# Patient Record
Sex: FEMALE | URBAN - METROPOLITAN AREA
[De-identification: names, ages, dates, MRNs, and addresses within clinical notes are randomized per-mention and may not be internally consistent; named-entity substitution may affect disease eponyms.]

---

## 2019-12-10 ENCOUNTER — APPOINTMENT (OUTPATIENT)
Age: 44
Setting detail: DERMATOLOGY
End: 2019-12-10

## 2019-12-10 DIAGNOSIS — L50.3 DERMATOGRAPHIC URTICARIA: ICD-10-CM

## 2019-12-10 PROBLEM — K75.9 INFLAMMATORY LIVER DISEASE, UNSPECIFIED: Status: ACTIVE | Noted: 2019-12-10

## 2019-12-10 PROCEDURE — 99202 OFFICE O/P NEW SF 15 MIN: CPT

## 2019-12-10 PROCEDURE — ? PRESCRIPTION

## 2019-12-10 PROCEDURE — ? COUNSELING

## 2019-12-10 PROCEDURE — ? TREATMENT REGIMEN

## 2019-12-10 ASSESSMENT — LOCATION ZONE DERM: LOCATION ZONE: TRUNK

## 2019-12-10 ASSESSMENT — LOCATION DETAILED DESCRIPTION DERM: LOCATION DETAILED: RIGHT MEDIAL UPPER BACK

## 2019-12-10 ASSESSMENT — LOCATION SIMPLE DESCRIPTION DERM: LOCATION SIMPLE: RIGHT UPPER BACK

## 2019-12-10 NOTE — PROCEDURE: TREATMENT REGIMEN
Detail Level: Zone
Modify Regimen: increase levocetirizine 5mg daily-> 10mg bid. then titrate downward as tolerated

## 2019-12-10 NOTE — PROCEDURE: MIPS QUALITY
Additional Notes: \\n\\n*** FLU SHOT since August 1, 2019:  NO
Detail Level: Detailed
Quality 110: Preventive Care And Screening: Influenza Immunization: Influenza Immunization not Administered for Documented Reasons.

## 2019-12-10 NOTE — HPI: RASH
What Type Of Note Output Would You Prefer (Optional)?: Standard Output
Is The Patient Presenting As Previously Scheduled?: Yes
How Severe Is Your Rash?: moderate
Is This A New Presentation, Or A Follow-Up?: Rash
Additional History: Patient was given antihistamines by pcp which helps

## 2021-11-09 ENCOUNTER — PREPPED CHART (OUTPATIENT)
Dept: URBAN - METROPOLITAN AREA CLINIC 33 | Facility: CLINIC | Age: 46
End: 2021-11-09

## 2021-11-09 PROBLEM — H43.812 POSTERIOR VITREOUS DETACHMENT: Noted: 2021-11-09

## 2021-11-09 PROBLEM — H44.112 PANUVEITIS: Noted: 2021-11-09

## 2021-11-09 PROBLEM — H43.821 VITREOMACULAR ADHESION: Noted: 2021-11-09

## 2021-11-09 PROBLEM — H43.392 VITREOUS OPACITIES: Noted: 2021-11-09

## 2021-11-09 PROBLEM — H31.093 CHORIORETINAL SCARS: Noted: 2021-11-09

## 2022-04-30 ASSESSMENT — VISUAL ACUITY
OS_CC: 20/30+2
OD_PH: 20/30-2
OD_CC: 20/60-2

## 2022-04-30 ASSESSMENT — TONOMETRY
OS_IOP_MMHG: 21
OD_IOP_MMHG: 19

## 2022-05-10 ENCOUNTER — FOLLOW UP (OUTPATIENT)
Dept: URBAN - METROPOLITAN AREA CLINIC 33 | Facility: CLINIC | Age: 47
End: 2022-05-10

## 2022-05-10 DIAGNOSIS — H43.821: ICD-10-CM

## 2022-05-10 DIAGNOSIS — H44.112: ICD-10-CM

## 2022-05-10 PROCEDURE — 92134 CPTRZ OPH DX IMG PST SGM RTA: CPT

## 2022-05-10 PROCEDURE — 92014 COMPRE OPH EXAM EST PT 1/>: CPT

## 2022-05-10 PROCEDURE — 92202 OPSCPY EXTND ON/MAC DRAW: CPT

## 2022-05-10 ASSESSMENT — TONOMETRY
OS_IOP_MMHG: 14
OD_IOP_MMHG: 16

## 2022-05-10 ASSESSMENT — VISUAL ACUITY
OD_CC: 20/200
OS_CC: 20/30-2
OD_PH: 20/50+1

## 2022-11-29 ENCOUNTER — FOLLOW UP (OUTPATIENT)
Dept: URBAN - METROPOLITAN AREA CLINIC 33 | Facility: CLINIC | Age: 47
End: 2022-11-29

## 2022-11-29 DIAGNOSIS — H43.812: ICD-10-CM

## 2022-11-29 DIAGNOSIS — H31.093: ICD-10-CM

## 2022-11-29 DIAGNOSIS — H44.112: ICD-10-CM

## 2022-11-29 DIAGNOSIS — H43.821: ICD-10-CM

## 2022-11-29 PROCEDURE — 92202 OPSCPY EXTND ON/MAC DRAW: CPT

## 2022-11-29 PROCEDURE — 92134 CPTRZ OPH DX IMG PST SGM RTA: CPT

## 2022-11-29 PROCEDURE — 99214 OFFICE O/P EST MOD 30 MIN: CPT

## 2022-11-29 ASSESSMENT — VISUAL ACUITY
OS_CC: 20/30-1
OD_PH: 20/50+1
OD_CC: 20/200

## 2022-11-29 ASSESSMENT — TONOMETRY
OS_IOP_MMHG: 22
OD_IOP_MMHG: 22

## 2024-02-20 ENCOUNTER — FOLLOW UP (OUTPATIENT)
Dept: URBAN - METROPOLITAN AREA CLINIC 33 | Facility: CLINIC | Age: 49
End: 2024-02-20

## 2024-02-20 DIAGNOSIS — H35.372: ICD-10-CM

## 2024-02-20 DIAGNOSIS — H43.821: ICD-10-CM

## 2024-02-20 DIAGNOSIS — H43.812: ICD-10-CM

## 2024-02-20 DIAGNOSIS — H44.112: ICD-10-CM

## 2024-02-20 DIAGNOSIS — H43.392: ICD-10-CM

## 2024-02-20 DIAGNOSIS — H31.093: ICD-10-CM

## 2024-02-20 PROCEDURE — 92134 CPTRZ OPH DX IMG PST SGM RTA: CPT

## 2024-02-20 PROCEDURE — 92202 OPSCPY EXTND ON/MAC DRAW: CPT

## 2024-02-20 PROCEDURE — 92014 COMPRE OPH EXAM EST PT 1/>: CPT

## 2024-02-20 ASSESSMENT — VISUAL ACUITY
OS_CC: 20/25-3
OD_CC: 20/25-3

## 2024-02-20 ASSESSMENT — TONOMETRY
OD_IOP_MMHG: 19
OS_IOP_MMHG: 21

## 2024-08-20 ENCOUNTER — FOLLOW UP (OUTPATIENT)
Dept: URBAN - METROPOLITAN AREA CLINIC 33 | Facility: CLINIC | Age: 49
End: 2024-08-20

## 2024-08-20 DIAGNOSIS — H35.372: ICD-10-CM

## 2024-08-20 DIAGNOSIS — H43.821: ICD-10-CM

## 2024-08-20 DIAGNOSIS — H44.112: ICD-10-CM

## 2024-08-20 PROCEDURE — 92202 OPSCPY EXTND ON/MAC DRAW: CPT

## 2024-08-20 PROCEDURE — 92014 COMPRE OPH EXAM EST PT 1/>: CPT

## 2024-08-20 PROCEDURE — 92134 CPTRZ OPH DX IMG PST SGM RTA: CPT

## 2024-08-20 PROCEDURE — 92235 FLUORESCEIN ANGRPH MLTIFRAME: CPT

## 2024-08-20 ASSESSMENT — TONOMETRY
OS_IOP_MMHG: 14
OD_IOP_MMHG: 16

## 2024-08-20 ASSESSMENT — VISUAL ACUITY
OS_PH: 20/20-3
OD_CC: 20/30+1
OS_CC: 20/25+2
OD_PH: 20/25-